# Patient Record
Sex: MALE | Race: WHITE
[De-identification: names, ages, dates, MRNs, and addresses within clinical notes are randomized per-mention and may not be internally consistent; named-entity substitution may affect disease eponyms.]

---

## 2020-05-07 ENCOUNTER — HOSPITAL ENCOUNTER (OUTPATIENT)
Dept: HOSPITAL 19 - MC.RAD | Age: 30
End: 2020-05-07
Attending: FAMILY MEDICINE
Payer: COMMERCIAL

## 2020-05-07 DIAGNOSIS — N63.20: Primary | ICD-10-CM

## 2020-12-29 ENCOUNTER — PREP FOR PROCEDURE (OUTPATIENT)
Dept: SURGERY | Age: 30
End: 2020-12-29

## 2020-12-29 ENCOUNTER — ANESTHESIA EVENT (OUTPATIENT)
Dept: OPERATING ROOM | Age: 30
End: 2020-12-29
Payer: COMMERCIAL

## 2020-12-29 ENCOUNTER — OFFICE VISIT (OUTPATIENT)
Dept: SURGERY | Age: 30
End: 2020-12-29
Payer: COMMERCIAL

## 2020-12-29 ENCOUNTER — TELEPHONE (OUTPATIENT)
Dept: SURGERY | Age: 30
End: 2020-12-29

## 2020-12-29 VITALS
SYSTOLIC BLOOD PRESSURE: 154 MMHG | DIASTOLIC BLOOD PRESSURE: 97 MMHG | TEMPERATURE: 97.3 F | BODY MASS INDEX: 38.36 KG/M2 | HEART RATE: 73 BPM | HEIGHT: 76 IN | WEIGHT: 315 LBS

## 2020-12-29 PROCEDURE — 99203 OFFICE O/P NEW LOW 30 MIN: CPT | Performed by: SURGERY

## 2020-12-29 RX ORDER — SODIUM CHLORIDE 0.9 % (FLUSH) 0.9 %
10 SYRINGE (ML) INJECTION EVERY 12 HOURS SCHEDULED
Status: CANCELLED | OUTPATIENT
Start: 2020-12-29

## 2020-12-29 RX ORDER — SODIUM CHLORIDE 0.9 % (FLUSH) 0.9 %
10 SYRINGE (ML) INJECTION PRN
Status: CANCELLED | OUTPATIENT
Start: 2020-12-29

## 2020-12-29 RX ORDER — OXYCODONE HYDROCHLORIDE AND ACETAMINOPHEN 5; 325 MG/1; MG/1
1 TABLET ORAL EVERY 4 HOURS PRN
Qty: 28 TABLET | Refills: 0 | Status: SHIPPED | OUTPATIENT
Start: 2020-12-29 | End: 2021-01-05

## 2020-12-29 SDOH — HEALTH STABILITY: MENTAL HEALTH: HOW MANY STANDARD DRINKS CONTAINING ALCOHOL DO YOU HAVE ON A TYPICAL DAY?: NOT ASKED

## 2020-12-29 SDOH — HEALTH STABILITY: MENTAL HEALTH: HOW OFTEN DO YOU HAVE A DRINK CONTAINING ALCOHOL?: MONTHLY OR LESS

## 2020-12-29 NOTE — LETTER
[facility] [address] [place] [address] [phone]  f [phone]    Padmini Aparicio MD                        SURGERY ORDER   -- Time of order -- 20    10:02 AM    Facility:   [facility]. # _________________                                                                                    Scheduled By:____________                  Surgery Date & Time:   20 @ 7:15 AM                                    Pt arrival: 5:30 AM                                                                                      Patient Name:  Minor Rayal     :  1990     PCP:  No primary care provider on file.       Home Ph:    [phone] (home)                                                     PROCEDURE:  Umbilical hernia, fat containing - K42.9    DIAGNOSIS:  Robotic ventral hernia repair with mesh, possible open  31966  Anesthesia: _General    Anesthesia: lines, blocks, TAP/epidural, etc: none  Time Needed:  1 hour    Pt Position:  supine    Bowel Prep: none  Outpatient _x_ Admit ___                Pre-Op to be done by: _N/A____  Cardiac Clearance Done by: __N/A______  Medications to be stopped 5 days before surgery: _________  Additional / Special Orders:                                                                                                                                                                                                        Padmini Aparicio MD

## 2020-12-29 NOTE — PROGRESS NOTES
[address] [address] [address] [facility] 100  Dept: [phone]  Dept Fax: [phone]  Loc: [phone]    Visit Date: 12/29/2020    Derrek Gonsales is a 35 y.o. male who presents today for: New Patient (umbilical hernia, pain since 12/23)      HPI:       Derrek Gonsales is a 35 y.o. male referred to me for further evaluation regarding painful umbilical hernia. 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 He states the pain has been intermittently severe for about 1 week. Denies previous similar symptoms. Does have a history of nausea and abdominal bloating. Had a colonoscopy about 10 years ago for blood in the stools, with only inflammatory polyps noted. Here today for further evaluation regarding recommendation for repair. Patient's problem list, medications, past medical, surgical, family, and social histories were reviewed and updated in the chart as indicated today. No past medical history on file. No past surgical history on file. Family history of uncle with colon cancer    Social History:   Social History     Tobacco Use    Smoking status: Never Smoker    Smokeless tobacco: Never Used   Substance Use Topics    Alcohol use: Yes     Frequency: Monthly or less      Tobacco cessation counseling provided as appropriate. REVIEW OF SYSTEMS:    Pertinent positives and negatives are mentioned in the HPI above. Otherwise, all other systems were reviewed and negative. Objective:     Physical Exam   BP (!) 154/97 (Site: Right Upper Arm, Position: Sitting, Cuff Size: Large Adult)   Pulse 73   Temp 97.3 °F (36.3 °C)   Ht 6' 4\" (1.93 m)   Wt (!) 325 lb (147.4 kg)   BMI 39.56 kg/m²   Constitutional: Appears well-developed and well-nourished. Grooming appropriate. No gross deformities. Body mass index is 39.56 kg/m². Eyes: No scleral icterus. Conjunctiva/lids normal. Vision intact grossly. Pupils equal/symmetric, reactive bilaterally. ENT: External ears/nose without defect, scars, or masses. Hearing grossly intact. No facial deformity. Lips normal, normal dentition. Neck: No masses. Trachea midline. No crepitus. Thyroid not enlarged. Cardiovascular: Normal rate. No peripheral edema. Abdominal aorta normal size to palpation. Pulmonary/Chest: Effort normal. No respiratory distress. No wheezes. No use of accessory muscles. Musculoskeletal: Normal range of motion x all 4 extremities and head/neck, without deformity, pain, or crepitus, with normal strength and tone. Normal gait. Nails without clubbing or cyanosis. Neurological: Alert and oriented to person, place, and time. No gross deficits. Sensation intact. Skin: Skin is dry. No rashes noted. No pallor. No induration of nodules. Psychiatric: Normal mood and affect. Behavior normal. Oriented to person, place, and time. Judgment and insight reasonable. Abdominal/wound: Soft, obese, nontender in all 4 quadrants; does have fat-containing umbilical hernia with tenderness in this area    Labs reviewed: None  Radiology reviewed: CT scan of abdomen and pelvis done at outside hospital facility reviewed and personally interpreted; small fat-containing umbilical hernia    Last colonoscopy: 10 years ago      Assessment/Plan:     A/P:  New problem(s): Fat-containing umbilical hernia with pain  Established problem(s): Obesity  Additional workup/treatment planned: Surgical repair  Risk of complications/morbidity: High    I had a long discussion with Christin Andrew today in the office regarding the pathophysiology, etiology, work-up, natural history, treatment options regarding umbilical hernia. We did discuss that it is likely the cause of his umbilical intermittent pain, but it is quite small on CT scan and only fat-containing. Did offer surgical repair, as he is currently in the off season. I did very carefully discussed with him that there is no urgency to repair from a medical standpoint, and that he would need to avoid heavy lifting strenuous activity for up to 6 weeks less than 10 to 15 pounds. Discussed risk factors for recurrence, such as obesity, stressing the repair to early, constipation. We also briefly discussed his other GI symptoms such as has a abdominal bloating and nausea. It is unlikely that these are related necessarily to the hernia, so no guarantee that these will improve after repair. He would like to have the surgery done this week, as next week he is going back home to [place]. Discussed with him that we will try to get him scheduled this week, but otherwise he can likely find a surgeon closer to home that can also do this surgery. Discussion regarding the risks, benefits, alternatives, expectations of lap and open hernia repair, including suture and mesh options, and complications including but not limited to: bleeding, infection, seroma, mesh infection, recurrence, damage to other structures. I provided written information in the After Visit Summary AVS Regarding: umbilical hernia    DISPOSITION:  Plan surgery soon    My findings will be relayed to consulting practitioner or PCP via Epic    Total encounter time:  35 min with > 50% spent with face-to-face counseling and coordination of care, including: Discussion, counseling, coordination of care as above    Note completed using dictation software, please excuse any errors.     Electronically signed by Jerzy Tan MD on 12/29/2020 at 9:57 AM

## 2020-12-29 NOTE — TELEPHONE ENCOUNTER
Called [facility] for prior authorization of surgery.  No prior authorization needed Ref# - [employer] [id number]

## 2020-12-29 NOTE — PROGRESS NOTES
[address] [facility] patients having surgery or anesthesia are required to be Covid tested. You will need to quarantined from the time you are tested until your surgery. PRIOR TO PROCEDURE DATE:  1. Please follow any guidelines/instructions prior to your procedure as advised by your surgeon. 2. Arrange for someone to drive you home and be with you for the first 24 hours after discharge for your safety after your procedure for which you received sedation. Ensure it is someone we can share information with regarding your discharge. 3. You must contact your surgeon for instructions IF:   You are taking any blood thinners, aspirin, anti-inflammatory or vitamin E.   There is a change in your physical condition such as a cold, fever, rash, cuts, sores or any other infection, especially near your surgical site. 4. Do not drink alcohol the day before or day of your procedure. 5. A Pre-op History and Physical for surgery MUST be completed by your Physician or Urgent Care within 30 days of your procedure date. Please bring a copy with you on the day of your procedure and along with any other testing performed. THE DAY OF YOUR PROCEDURE:  1. Follow instructions for ARRIVAL TIME as DIRECTED BY YOUR SURGEON. I    2. Enter the MAIN entrance from [employer] and follow the signs to the free [employer] or [employer] parking (offered free of charge 6am-5pm). 3. Enter the Main Entrance of the hospital (do not enter from the lower level of the parking garage). Upon entrance, check in with the  at the main desk on your left. If no one is available at the desk, proceed into the [facility] Waiting Room and go through the door directly into the [facility]. There is a Check-in desk ACROSS from Room 5 (marked with a sign hanging from the ceiling). The phone number for the surgery center is [phone].     4. Please call [phone] option #2 option #2 if you have not been preregistered yet. On the day of your procedure bring your insurance card and photo ID. You will be registered at your bedside once brought back to your room. 5. DO NOT EAT ANYTHING eight hours prior to surgery. May have 8 ounces of water 4 hours prior to surgery. 6. MEDICATIONS    Take the following medications with a SMALL sip of water:    Use your usual dose of inhalers the morning of surgery. BRING your rescue inhaler with you to hospital.    Anesthesia does NOT want you to take insulin the morning of surgery. They will control your blood sugar while you are at the hospital. Please contact your ordering physician for instructions regarding your insulin the night before your procedure. If you have an insulin pump, please keep it set on basal rate. 7. Do not swallow water when brushing teeth. No gum, candy, mints or ice chips. Refrain from smoking or at least decrease the amount. 8. Dress in loose, comfortable clothing appropriate for redressing after your procedure. Do not wear jewelry (including body piercings), make-up (especially NO eye make-up), fingernail polish (NO toenail polish if foot/leg surgery), lotion, powders or metal hairclips. 9. Dentures, glasses, or contacts will need to be removed before your procedure. Bring cases for your glasses, contacts, dentures, or hearing aids to protect them while you are in surgery. 10. If you use a CPAP, please bring it with you on the day of your procedure. 11. We recommend that valuable personal  belongings such as cash, cell phones, e-tablets or jewelry, be left at home during your stay. The hospital will not be responsible for valuables that are not secured in the hospital safe. However, if your insurance requires a co-pay, you may want to bring a method of payment, i.e. Check or credit card, if you wish to pay your co-pay the day of surgery.       12. If you are to stay overnight, you may bring a bag with personal items. Please have any large items you may need brought in by your family after your arrival to your hospital room. 15. If you have a Living Will or Durable Power of , please bring a copy on the day of your procedure. 15. With your permission, one family member may accompany you while you are being prepared for surgery. Once you are ready, additional family members may join you. HOW WE KEEP YOU SAFE and WORK TO PREVENT SURGICAL SITE INFECTIONS:  1. Health care workers should always check your ID bracelet to verify your name and birth date. You will be asked many times to state your name, date of birth, and allergies. 2. Health care workers should always clean their hands with soap or alcohol gel before providing care to you. It is okay to ask anyone if they cleaned their hands before they touch you. 3. You will be actively involved in verifying the type of procedure you are having and ensuring the correct surgical site. This will be confirmed multiple times prior to your procedure. Do NOT tobin your surgery site UNLESS instructed to by your surgeon. 4. Do not shave or wax for 72 hours prior to procedure near your operative site. Shaving with a razor can irritate your skin and make it easier to develop an infection. On the day of your procedure, any hair that needs to be removed near the surgical site will be clipped by a healthcare worker using a special clippers designed to avoid skin irritation. 5. When you are in the operating room, your surgical site will be cleansed with a special soap, and in most cases, you will be given an antibiotic before the surgery begins. What to expect AFTER YOUR PROCEDURE:  1. Immediately following your procedure, your will be taken to the PACU for the first phase of your recovery.   Your nurse will help you recover from any potential side effects of anesthesia, such as extreme drowsiness, changes in your vital signs or breathing patterns. Nausea, headache, muscle aches, or sore throat may also occur after anesthesia. Your nurse will help you manage these potential side effects. 2. For comfort and safety, arrange to have someone at home with you for the first 24 hours after discharge. 3. You and your family will be given written instructions about your diet, activity, dressing care, medications, and return visits. 4. Once at home, should issues with nausea, pain, or bleeding occur, or should you notice any signs of infection, you should call your surgeon. 5. Always clean your hands before and after caring for your wound. Do not let your family touch your surgery site without cleaning their hands. 6. Narcotic pain medications can cause significant constipation. You may want to add a stool softener to your postoperative medication schedule or speak to your surgeon on how best to manage this SIDE EFFECT. SPECIAL INSTRUCTIONS     Thank you for allowing us to care for you. We strive to exceed your expectations in the delivery of care and service provided to you and your family. If you need to contact us for any reason, please call us at [phone]    Instructions reviewed with patient during preadmission testing phone interview. Jacki Gleason. 12/29/2020 .1:36 PM      ADDITIONAL EDUCATIONAL INFORMATION REVIEWED PER PHONE WITH YOU AND/OR YOUR FAMILY:  No Bring a urine sample on day of surgery  No Hibiclens® Bathing Instructions   Yes Antibacterial Soap

## 2020-12-29 NOTE — PROGRESS NOTES
[facility] / [facility] ([facility]) [address] [facility], [address]    Acknowledgment of Informed Consent for Surgical or Medical Procedure and Sedation  I agree to allow doctor(s) Pacheco Roberto and his/her associates or assistants, including residents and/or other qualified medical practitioner to perform the following medical treatment or procedure and to administer or direct the administration of sedation as necessary:  Procedure(s): ROBOTIC [address] [facility], POSSIBLE OPEN  My doctor has explained the following regarding the proposed procedure:   the explanation of the procedure   the benefits of the procedure   the potential problems that might occur during recuperation   the risks and side effects of the procedure which could include but are not limited to severe blood loss, infection, stroke or death   the benefits, risks and side effect of alternative procedures including the consequences of declining this procedure or any alternative procedures   the likelihood of achieving satisfactory results. I acknowledge no guarantee or assurance has been made to me regarding the results. I understand that during the course of this treatment/procedure, unforeseen conditions can occur which require an additional or different procedure. I agree to allow my physician or assistants to perform such extension of the original procedure as they may find necessary. I understand that sedation will often result in temporary impairment of memory and fine motor skills and that sedation can occasionally progress to a state of deep sedation or general anesthesia. I understand the risks of anesthesia for surgery include, but are not limited to, sore throat, hoarseness, injury to face, mouth, or teeth; nausea; headache; injury to blood vessels or nerves; death, brain damage, or paralysis.     I understand that if I have a Limitation of Treatment order in effect during my hospitalization, the order may or may not be in effect during this procedure. I give my doctor permission to give me blood or blood products. I understand that there are risks with receiving blood such as hepatitis, AIDS, fever, or allergic reaction. I acknowledge that the risks, benefits, and alternatives of this treatment have been explained to me and that no express or implied warranty has been given by the hospital, any blood bank, or any person or entity as to the blood or blood components transfused. At the discretion of my doctor, I agree to allow observers, equipment/product representatives and allow photographing, and/or televising of the procedure, provided my name or identity is maintained confidentially. I agree the hospital may dispose of or use for scientific or educational purposes any tissue, fluid, or body parts which may be removed.     ________________________________Date________Time______ am/pm  ([place] [facility])  Patient or Signature of Closest Relative or Legal Guardian    ________________________________Date________Time______am/pm      Page 1 of  1  Witness

## 2020-12-29 NOTE — PATIENT INSTRUCTIONS
VENTRAL/UMBILICAL HERNIA PATIENT INFORMATION          What is a hernia? A hernia is a bulge under the skin in your belly. It happens when you have a weak spot in your belly muscles and a piece of your intestines or other tissues can poke through your muscles. This can cause pain or a noticeable bulge. You may notice the pain most when you lift something heavy. Occasionally, the intestine can get stuck in the hernia that can cause a blockage. This is an emergency situation. Hernias in most situations should be repaired electively (before an emergency) to prevent bowel blockage. You may have a hernia near your belly button (congenital). Or it may be in a scar from a previous surgery. How is a hernia fixed? There are several approaches to have a hernia fixed. Most repairs involve the use of a piece of screen (mesh) which can help strengthen the repair:    1) Open surgery - involves a larger cut around the hernia and placement of mesh. 2) Laparoscopic surgery - involves small \"key-hole\" incisions away from the hernia itself and the use of a video camera and long handled instruments. Mesh is secured from the inside using small tacks and suture. This will leave 3 or 4 small scars. Pain is typically less than with open surgery. Sometimes surgery will be started laparoscopic, but need to be finished open depending on scar tissue and other factors. 3) Robotic surgery - similar to laparoscopic surgery, this involves small \"key-hole\" incisions and mesh. The mesh is typically secured from the inside using slowly dissolvable sutures. This will leave 3 or 4 small scars. Pain is typically less than with open surgery. Sometimes surgery will be started robotic, but need to be finished laparoscopic or open depending on scar tissue and other factors. You will probably need to take 1 to 2 weeks off from work. If your job requires heavy lifting or other physical work, you may need to take 4 to 6 weeks off or be put on light duty. Preparing for surgery   · Understand exactly what surgery is planned, along with the risks, benefits, and other options. · Tell your doctors ALL the medicines, vitamins, supplements, and herbal remedies you take. Some of these can increase the risk of bleeding or interact with anesthesia. · If you take blood thinners, such as warfarin (Coumadin), clopidogrel (Plavix), or aspirin, be sure to talk to your doctor. Make sure that you understand exactly what your doctor wants you to do. · Your doctor will tell you which medicines to take or stop before your surgery. You may need to stop taking certain medicines a week or more before surgery. So talk to your doctor as soon as you can. · If you have an advance directive, let your doctor know. It may include a living will and a durable power of  for health care. Bring a copy to the hospital. If you don't have one, you may want to prepare one. It lets your doctor and loved ones know your health care wishes. Doctors advise that everyone prepare these papers before any type of surgery or procedure. What happens on the day of surgery? · Follow the instructions exactly about when to stop eating and drinking. If you don't, your surgery may be canceled. If your doctor told you to take your medicines on the day of surgery, take them with only a sip of water. · Take a bath or shower before you come in for your surgery. Do not apply lotions, perfumes, deodorants, or nail polish. · Do not shave the surgical site yourself. · Take off all jewelry and piercings. And take out contact lenses, if you wear them. At the hospital or surgery center   · Bring a picture ID. · You will be kept comfortable and safe by your anesthesia provider. You will be asleep during the surgery. · The surgery will take 30 minutes to 2 hours. It depends on how large the hernia is and where it is. What to expect after your surgery:  - In most cases, you can go home on the same day of surgery. You will need someone to drive you home and monitor you for 24 hours after anesthesia. Some patients may stay in the hospital overnight if needed - this is more typical in large hernias or patients with other medical problems requiring careful monitoring.    - Every patient has a different level of pain tolerance. - Most patients do well with a combination of:   - Tylenol (do not take more than 3000 mg per day)   - Ibuprofen or Motrin (NSAIDS)   - Narcotics such as Percocet or Oxycodone - only if needed. - Be careful taking narcotics, as these can make you constipated    - Use a stool softener and drink plenty of water to avoid constipation.  - Do not drive or operate machinery while taking narcotics       - Abdominal elastic binders and ice packs can also be helpful   - You should support your abdomen while coughing, with a pillow or blanket     - If you have liver disease, you should not take Tylenol   - If you have kidney disease or stomach ulcers, you should not take ibuprofen or other NSAIDS    Risks of surgery: All surgeries have risks.  Here are some of the risks that can occur with hernia surgery  - Bleeding (uncommon)  - Infection (uncommon - occasionally resulting in the need to take out the mesh)  - Fluid forming around the mesh (common - sometimes requires drainage)  - Scar formation resulting in bowel blockage (uncommon)  - Recurrence of hernia (common depending on risk factors - see below)  - Damage to other structures in the abdomen (uncommon)  - Risks of anesthesia (rare)    Risk factors for recurrence: Even with a good hernia repair, recurrence can happen. Some risk factors that increase the risk of recurrence include:    - Overuse of abdominal muscles too soon or heavy lifting  - Smoking  - Heavy lifting  - COPD and other lung diseases  - Straining and constipation  - Excessive coughing  - Use of steroids  - Uncontrolled diabetes  - Infection  - Large hernias  - Repair not utilizing mesh    Follow-up care is a key part of your treatment and safety. Be sure to make and go to all appointments, and call your doctor if you are having problems. It's also a good idea to know your test results and keep a list of the medicines you take. When should you call your doctor? · You have questions or concerns. Dr Clancy Simpler office: [phone]    · You don't understand how to prepare for your surgery. · You become ill before the surgery (such as fever, flu, or a cold). · You need to reschedule or have changed your mind about having the surgery. Please call the office ([phone]) or go to the nearest Emergency Room if you experience:  - Severe pain or swelling  - Fever higher than 101.   - Foul smelling drainage from your wounds  - Inability to urinate  - Decreased appetite, nausea, or increasing bloating  - Any other concerns or questions

## 2020-12-30 ENCOUNTER — HOSPITAL ENCOUNTER (OUTPATIENT)
Age: 30
Setting detail: OUTPATIENT SURGERY
Discharge: HOME | End: 2020-12-30
Attending: SURGERY | Admitting: SURGERY
Payer: COMMERCIAL

## 2020-12-30 ENCOUNTER — ANESTHESIA (OUTPATIENT)
Dept: OPERATING ROOM | Age: 30
End: 2020-12-30
Payer: COMMERCIAL

## 2020-12-30 ENCOUNTER — HOSPITAL ENCOUNTER (EMERGENCY)
Age: 30
Discharge: HOME | End: 2020-12-31
Attending: EMERGENCY MEDICINE
Payer: COMMERCIAL

## 2020-12-30 VITALS — TEMPERATURE: 97.7 F | OXYGEN SATURATION: 97 % | SYSTOLIC BLOOD PRESSURE: 111 MMHG | DIASTOLIC BLOOD PRESSURE: 56 MMHG

## 2020-12-30 VITALS
HEIGHT: 76 IN | OXYGEN SATURATION: 92 % | HEART RATE: 86 BPM | TEMPERATURE: 97 F | BODY MASS INDEX: 38.36 KG/M2 | DIASTOLIC BLOOD PRESSURE: 73 MMHG | SYSTOLIC BLOOD PRESSURE: 142 MMHG | RESPIRATION RATE: 16 BRPM | WEIGHT: 315 LBS

## 2020-12-30 DIAGNOSIS — K42.9 UMBILICAL HERNIA WITHOUT OBSTRUCTION AND WITHOUT GANGRENE: ICD-10-CM

## 2020-12-30 DIAGNOSIS — R50.82 POST-PROCEDURAL FEVER: Primary | ICD-10-CM

## 2020-12-30 PROCEDURE — 3700000001 HC ADD 15 MINUTES (ANESTHESIA): Performed by: SURGERY

## 2020-12-30 PROCEDURE — 6360000002 HC RX W HCPCS: Performed by: ANESTHESIOLOGY

## 2020-12-30 PROCEDURE — 6360000002 HC RX W HCPCS: Performed by: SURGERY

## 2020-12-30 PROCEDURE — 2580000003 HC RX 258: Performed by: SURGERY

## 2020-12-30 PROCEDURE — 2500000003 HC RX 250 WO HCPCS: Performed by: SURGERY

## 2020-12-30 PROCEDURE — S2900 ROBOTIC SURGICAL SYSTEM: HCPCS | Performed by: SURGERY

## 2020-12-30 PROCEDURE — 6360000002 HC RX W HCPCS: Performed by: NURSE ANESTHETIST, CERTIFIED REGISTERED

## 2020-12-30 PROCEDURE — 3600000019 HC SURGERY ROBOT ADDTL 15MIN: Performed by: SURGERY

## 2020-12-30 PROCEDURE — 85025 COMPLETE CBC W/AUTO DIFF WBC: CPT

## 2020-12-30 PROCEDURE — 7100000000 HC PACU RECOVERY - FIRST 15 MIN: Performed by: SURGERY

## 2020-12-30 PROCEDURE — 88302 TISSUE EXAM BY PATHOLOGIST: CPT

## 2020-12-30 PROCEDURE — 2500000003 HC RX 250 WO HCPCS: Performed by: NURSE ANESTHETIST, CERTIFIED REGISTERED

## 2020-12-30 PROCEDURE — C1781 MESH (IMPLANTABLE): HCPCS | Performed by: SURGERY

## 2020-12-30 PROCEDURE — 49653 PR LAP, VENTRAL HERNIA REPAIR,INCARCERATED: CPT | Performed by: SURGERY

## 2020-12-30 PROCEDURE — 2580000003 HC RX 258: Performed by: NURSE ANESTHETIST, CERTIFIED REGISTERED

## 2020-12-30 PROCEDURE — 7100000010 HC PHASE II RECOVERY - FIRST 15 MIN: Performed by: SURGERY

## 2020-12-30 PROCEDURE — 96374 THER/PROPH/DIAG INJ IV PUSH: CPT

## 2020-12-30 PROCEDURE — 2580000003 HC RX 258: Performed by: ANESTHESIOLOGY

## 2020-12-30 PROCEDURE — 80048 BASIC METABOLIC PNL TOTAL CA: CPT

## 2020-12-30 PROCEDURE — 99283 EMERGENCY DEPT VISIT LOW MDM: CPT

## 2020-12-30 PROCEDURE — 6370000000 HC RX 637 (ALT 250 FOR IP): Performed by: ANESTHESIOLOGY

## 2020-12-30 PROCEDURE — 7100000001 HC PACU RECOVERY - ADDTL 15 MIN: Performed by: SURGERY

## 2020-12-30 PROCEDURE — 2709999900 HC NON-CHARGEABLE SUPPLY: Performed by: SURGERY

## 2020-12-30 PROCEDURE — 7100000011 HC PHASE II RECOVERY - ADDTL 15 MIN: Performed by: SURGERY

## 2020-12-30 PROCEDURE — 3600000009 HC SURGERY ROBOT BASE: Performed by: SURGERY

## 2020-12-30 PROCEDURE — 3700000000 HC ANESTHESIA ATTENDED CARE: Performed by: SURGERY

## 2020-12-30 PROCEDURE — L0450 TLSO FLEX TRUNK/THOR PRE OTS: HCPCS | Performed by: SURGERY

## 2020-12-30 DEVICE — MESH SURG DIA4.5IN CIR SEPRA TECHNOLOGY VENTRALIGHT: Type: IMPLANTABLE DEVICE | Site: UMBILICAL | Status: FUNCTIONAL

## 2020-12-30 RX ORDER — FENTANYL CITRATE 50 UG/ML
25 INJECTION, SOLUTION INTRAMUSCULAR; INTRAVENOUS EVERY 5 MIN PRN
Status: DISCONTINUED | OUTPATIENT
Start: 2020-12-30 | End: 2020-12-30 | Stop reason: HOSPADM

## 2020-12-30 RX ORDER — SODIUM CHLORIDE, SODIUM LACTATE, POTASSIUM CHLORIDE, CALCIUM CHLORIDE 600; 310; 30; 20 MG/100ML; MG/100ML; MG/100ML; MG/100ML
INJECTION, SOLUTION INTRAVENOUS CONTINUOUS
Status: DISCONTINUED | OUTPATIENT
Start: 2020-12-30 | End: 2020-12-30 | Stop reason: HOSPADM

## 2020-12-30 RX ORDER — PROPOFOL 10 MG/ML
INJECTION, EMULSION INTRAVENOUS PRN
Status: DISCONTINUED | OUTPATIENT
Start: 2020-12-30 | End: 2020-12-30 | Stop reason: SDUPTHER

## 2020-12-30 RX ORDER — SODIUM CHLORIDE 0.9 % (FLUSH) 0.9 %
10 SYRINGE (ML) INJECTION PRN
Status: DISCONTINUED | OUTPATIENT
Start: 2020-12-30 | End: 2020-12-30 | Stop reason: HOSPADM

## 2020-12-30 RX ORDER — LABETALOL 20 MG/4 ML (5 MG/ML) INTRAVENOUS SYRINGE
5 EVERY 10 MIN PRN
Status: DISCONTINUED | OUTPATIENT
Start: 2020-12-30 | End: 2020-12-30 | Stop reason: HOSPADM

## 2020-12-30 RX ORDER — ENALAPRILAT 2.5 MG/2ML
1.25 INJECTION INTRAVENOUS
Status: DISCONTINUED | OUTPATIENT
Start: 2020-12-30 | End: 2020-12-30 | Stop reason: HOSPADM

## 2020-12-30 RX ORDER — ROCURONIUM BROMIDE 10 MG/ML
INJECTION, SOLUTION INTRAVENOUS PRN
Status: DISCONTINUED | OUTPATIENT
Start: 2020-12-30 | End: 2020-12-30 | Stop reason: SDUPTHER

## 2020-12-30 RX ORDER — HYDROMORPHONE HCL 110MG/55ML
PATIENT CONTROLLED ANALGESIA SYRINGE INTRAVENOUS PRN
Status: DISCONTINUED | OUTPATIENT
Start: 2020-12-30 | End: 2020-12-30 | Stop reason: SDUPTHER

## 2020-12-30 RX ORDER — FENTANYL CITRATE 50 UG/ML
50 INJECTION, SOLUTION INTRAMUSCULAR; INTRAVENOUS EVERY 5 MIN PRN
Status: DISCONTINUED | OUTPATIENT
Start: 2020-12-30 | End: 2020-12-30 | Stop reason: HOSPADM

## 2020-12-30 RX ORDER — KETOROLAC TROMETHAMINE 30 MG/ML
INJECTION, SOLUTION INTRAMUSCULAR; INTRAVENOUS PRN
Status: DISCONTINUED | OUTPATIENT
Start: 2020-12-30 | End: 2020-12-30 | Stop reason: SDUPTHER

## 2020-12-30 RX ORDER — ONDANSETRON 2 MG/ML
INJECTION INTRAMUSCULAR; INTRAVENOUS PRN
Status: DISCONTINUED | OUTPATIENT
Start: 2020-12-30 | End: 2020-12-30 | Stop reason: SDUPTHER

## 2020-12-30 RX ORDER — MIDAZOLAM HYDROCHLORIDE 1 MG/ML
2 INJECTION INTRAMUSCULAR; INTRAVENOUS
Status: COMPLETED | OUTPATIENT
Start: 2020-12-30 | End: 2020-12-30

## 2020-12-30 RX ORDER — DEXAMETHASONE SODIUM PHOSPHATE 4 MG/ML
INJECTION, SOLUTION INTRA-ARTICULAR; INTRALESIONAL; INTRAMUSCULAR; INTRAVENOUS; SOFT TISSUE PRN
Status: DISCONTINUED | OUTPATIENT
Start: 2020-12-30 | End: 2020-12-30 | Stop reason: SDUPTHER

## 2020-12-30 RX ORDER — FENTANYL CITRATE 50 UG/ML
INJECTION, SOLUTION INTRAMUSCULAR; INTRAVENOUS PRN
Status: DISCONTINUED | OUTPATIENT
Start: 2020-12-30 | End: 2020-12-30 | Stop reason: SDUPTHER

## 2020-12-30 RX ORDER — LIDOCAINE HYDROCHLORIDE 20 MG/ML
INJECTION, SOLUTION INTRAVENOUS PRN
Status: DISCONTINUED | OUTPATIENT
Start: 2020-12-30 | End: 2020-12-30 | Stop reason: SDUPTHER

## 2020-12-30 RX ORDER — ATROPINE SULFATE 0.1 MG/ML
INJECTION INTRAVENOUS PRN
Status: DISCONTINUED | OUTPATIENT
Start: 2020-12-30 | End: 2020-12-30 | Stop reason: SDUPTHER

## 2020-12-30 RX ORDER — GLYCOPYRROLATE 0.2 MG/ML
INJECTION INTRAMUSCULAR; INTRAVENOUS PRN
Status: DISCONTINUED | OUTPATIENT
Start: 2020-12-30 | End: 2020-12-30 | Stop reason: SDUPTHER

## 2020-12-30 RX ORDER — SODIUM CHLORIDE 0.9 % (FLUSH) 0.9 %
10 SYRINGE (ML) INJECTION EVERY 12 HOURS SCHEDULED
Status: DISCONTINUED | OUTPATIENT
Start: 2020-12-30 | End: 2020-12-30 | Stop reason: HOSPADM

## 2020-12-30 RX ORDER — HYDRALAZINE HYDROCHLORIDE 20 MG/ML
5 INJECTION INTRAMUSCULAR; INTRAVENOUS EVERY 5 MIN PRN
Status: DISCONTINUED | OUTPATIENT
Start: 2020-12-30 | End: 2020-12-30 | Stop reason: HOSPADM

## 2020-12-30 RX ORDER — MIDAZOLAM HYDROCHLORIDE 1 MG/ML
INJECTION INTRAMUSCULAR; INTRAVENOUS PRN
Status: DISCONTINUED | OUTPATIENT
Start: 2020-12-30 | End: 2020-12-30 | Stop reason: SDUPTHER

## 2020-12-30 RX ORDER — LIDOCAINE HYDROCHLORIDE 10 MG/ML
1 INJECTION, SOLUTION EPIDURAL; INFILTRATION; INTRACAUDAL; PERINEURAL
Status: DISCONTINUED | OUTPATIENT
Start: 2020-12-30 | End: 2020-12-30 | Stop reason: HOSPADM

## 2020-12-30 RX ORDER — EPHEDRINE SULFATE 50 MG/ML
INJECTION INTRAVENOUS PRN
Status: DISCONTINUED | OUTPATIENT
Start: 2020-12-30 | End: 2020-12-30 | Stop reason: SDUPTHER

## 2020-12-30 RX ORDER — OXYCODONE HYDROCHLORIDE AND ACETAMINOPHEN 5; 325 MG/1; MG/1
1 TABLET ORAL ONCE
Status: COMPLETED | OUTPATIENT
Start: 2020-12-30 | End: 2020-12-30

## 2020-12-30 RX ORDER — ONDANSETRON 2 MG/ML
4 INJECTION INTRAMUSCULAR; INTRAVENOUS
Status: DISCONTINUED | OUTPATIENT
Start: 2020-12-30 | End: 2020-12-30 | Stop reason: HOSPADM

## 2020-12-30 RX ADMIN — ROCURONIUM BROMIDE 70 MG: 10 INJECTION INTRAVENOUS at 07:22

## 2020-12-30 RX ADMIN — ROCURONIUM BROMIDE 30 MG: 10 INJECTION INTRAVENOUS at 07:46

## 2020-12-30 RX ADMIN — DEXMEDETOMIDINE HYDROCHLORIDE 8 MCG: 100 INJECTION, SOLUTION INTRAVENOUS at 08:51

## 2020-12-30 RX ADMIN — EPHEDRINE SULFATE 5 MG: 50 INJECTION INTRAVENOUS at 07:45

## 2020-12-30 RX ADMIN — SODIUM CHLORIDE, POTASSIUM CHLORIDE, SODIUM LACTATE AND CALCIUM CHLORIDE: 600; 310; 30; 20 INJECTION, SOLUTION INTRAVENOUS at 06:15

## 2020-12-30 RX ADMIN — HYDROMORPHONE HYDROCHLORIDE 0.5 MG: 2 INJECTION, SOLUTION INTRAMUSCULAR; INTRAVENOUS; SUBCUTANEOUS at 09:02

## 2020-12-30 RX ADMIN — KETOROLAC TROMETHAMINE 30 MG: 30 INJECTION, SOLUTION INTRAMUSCULAR; INTRAVENOUS at 08:50

## 2020-12-30 RX ADMIN — ONDANSETRON 4 MG: 2 INJECTION INTRAMUSCULAR; INTRAVENOUS at 07:22

## 2020-12-30 RX ADMIN — ATROPINE SULFATE 0.1 MG: 0.1 INJECTION, SOLUTION INTRAVENOUS at 07:44

## 2020-12-30 RX ADMIN — PROPOFOL 200 MG: 10 INJECTION, EMULSION INTRAVENOUS at 07:22

## 2020-12-30 RX ADMIN — VANCOMYCIN HYDROCHLORIDE 2000 MG: 10 INJECTION, POWDER, LYOPHILIZED, FOR SOLUTION INTRAVENOUS at 06:30

## 2020-12-30 RX ADMIN — LIDOCAINE HYDROCHLORIDE 60 MG: 20 INJECTION, SOLUTION INTRAVENOUS at 07:22

## 2020-12-30 RX ADMIN — HYDROMORPHONE HYDROCHLORIDE 0.5 MG: 1 INJECTION, SOLUTION INTRAMUSCULAR; INTRAVENOUS; SUBCUTANEOUS at 09:43

## 2020-12-30 RX ADMIN — DEXMEDETOMIDINE HYDROCHLORIDE 8 MCG: 100 INJECTION, SOLUTION INTRAVENOUS at 07:22

## 2020-12-30 RX ADMIN — SODIUM CHLORIDE, POTASSIUM CHLORIDE, SODIUM LACTATE AND CALCIUM CHLORIDE: 600; 310; 30; 20 INJECTION, SOLUTION INTRAVENOUS at 08:33

## 2020-12-30 RX ADMIN — DEXMEDETOMIDINE HYDROCHLORIDE 12 MCG: 100 INJECTION, SOLUTION INTRAVENOUS at 07:26

## 2020-12-30 RX ADMIN — MIDAZOLAM 2 MG: 1 INJECTION INTRAMUSCULAR; INTRAVENOUS at 06:48

## 2020-12-30 RX ADMIN — GLYCOPYRROLATE 0.2 MG: 0.2 INJECTION INTRAMUSCULAR; INTRAVENOUS at 07:42

## 2020-12-30 RX ADMIN — FAMOTIDINE 20 MG: 10 INJECTION, SOLUTION INTRAVENOUS at 07:39

## 2020-12-30 RX ADMIN — HYDROMORPHONE HYDROCHLORIDE 0.5 MG: 1 INJECTION, SOLUTION INTRAMUSCULAR; INTRAVENOUS; SUBCUTANEOUS at 10:00

## 2020-12-30 RX ADMIN — DEXAMETHASONE SODIUM PHOSPHATE 8 MG: 4 INJECTION, SOLUTION INTRAMUSCULAR; INTRAVENOUS at 07:22

## 2020-12-30 RX ADMIN — HYDROMORPHONE HYDROCHLORIDE 0.5 MG: 2 INJECTION, SOLUTION INTRAMUSCULAR; INTRAVENOUS; SUBCUTANEOUS at 09:08

## 2020-12-30 RX ADMIN — FENTANYL CITRATE 100 MCG: 50 INJECTION INTRAMUSCULAR; INTRAVENOUS at 07:22

## 2020-12-30 RX ADMIN — SODIUM CHLORIDE, POTASSIUM CHLORIDE, SODIUM LACTATE AND CALCIUM CHLORIDE: 600; 310; 30; 20 INJECTION, SOLUTION INTRAVENOUS at 06:00

## 2020-12-30 RX ADMIN — ENOXAPARIN SODIUM 40 MG: 40 INJECTION SUBCUTANEOUS at 06:48

## 2020-12-30 RX ADMIN — VANCOMYCIN HYDROCHLORIDE 2 G: 10 INJECTION, POWDER, LYOPHILIZED, FOR SOLUTION INTRAVENOUS at 07:15

## 2020-12-30 RX ADMIN — SUGAMMADEX 200 MG: 100 INJECTION, SOLUTION INTRAVENOUS at 08:53

## 2020-12-30 RX ADMIN — DEXMEDETOMIDINE HYDROCHLORIDE 8 MCG: 100 INJECTION, SOLUTION INTRAVENOUS at 08:53

## 2020-12-30 RX ADMIN — HYDROMORPHONE HYDROCHLORIDE 0.5 MG: 2 INJECTION, SOLUTION INTRAMUSCULAR; INTRAVENOUS; SUBCUTANEOUS at 08:57

## 2020-12-30 RX ADMIN — OXYCODONE HYDROCHLORIDE AND ACETAMINOPHEN 1 TABLET: 5; 325 TABLET ORAL at 10:45

## 2020-12-30 RX ADMIN — GLYCOPYRROLATE 0.2 MG: 0.2 INJECTION INTRAMUSCULAR; INTRAVENOUS at 08:50

## 2020-12-30 RX ADMIN — MIDAZOLAM HYDROCHLORIDE 2 MG: 2 INJECTION, SOLUTION INTRAMUSCULAR; INTRAVENOUS at 07:15

## 2020-12-30 RX ADMIN — HYDROMORPHONE HYDROCHLORIDE 0.5 MG: 2 INJECTION, SOLUTION INTRAMUSCULAR; INTRAVENOUS; SUBCUTANEOUS at 08:01

## 2020-12-30 RX ADMIN — EPHEDRINE SULFATE 5 MG: 50 INJECTION INTRAVENOUS at 07:51

## 2020-12-30 ASSESSMENT — ENCOUNTER SYMPTOMS
EYES NEGATIVE: 1
ABDOMINAL PAIN: 1
RESPIRATORY NEGATIVE: 1

## 2020-12-30 ASSESSMENT — PULMONARY FUNCTION TESTS
PIF_VALUE: 28
PIF_VALUE: 21
PIF_VALUE: 24
PIF_VALUE: 20
PIF_VALUE: 27
PIF_VALUE: 28
PIF_VALUE: 21
PIF_VALUE: 27
PIF_VALUE: 28
PIF_VALUE: 28
PIF_VALUE: 20
PIF_VALUE: 13
PIF_VALUE: 24
PIF_VALUE: 27
PIF_VALUE: 25
PIF_VALUE: 28
PIF_VALUE: 24
PIF_VALUE: 27
PIF_VALUE: 26
PIF_VALUE: 25
PIF_VALUE: 21
PIF_VALUE: 18
PIF_VALUE: 25
PIF_VALUE: 0
PIF_VALUE: 21
PIF_VALUE: 21
PIF_VALUE: 27
PIF_VALUE: 20
PIF_VALUE: 24
PIF_VALUE: 24
PIF_VALUE: 5
PIF_VALUE: 4
PIF_VALUE: 26
PIF_VALUE: 5
PIF_VALUE: 20
PIF_VALUE: 28
PIF_VALUE: 25
PIF_VALUE: 27
PIF_VALUE: 6
PIF_VALUE: 23
PIF_VALUE: 27
PIF_VALUE: 38
PIF_VALUE: 28
PIF_VALUE: 2
PIF_VALUE: 24
PIF_VALUE: 7
PIF_VALUE: 25
PIF_VALUE: 0
PIF_VALUE: 20
PIF_VALUE: 0
PIF_VALUE: 20
PIF_VALUE: 27
PIF_VALUE: 27
PIF_VALUE: 24
PIF_VALUE: 3
PIF_VALUE: 5
PIF_VALUE: 26
PIF_VALUE: 28
PIF_VALUE: 24
PIF_VALUE: 27
PIF_VALUE: 27
PIF_VALUE: 20
PIF_VALUE: 25
PIF_VALUE: 24
PIF_VALUE: 28
PIF_VALUE: 28
PIF_VALUE: 21
PIF_VALUE: 28
PIF_VALUE: 27
PIF_VALUE: 5
PIF_VALUE: 28
PIF_VALUE: 28
PIF_VALUE: 2
PIF_VALUE: 8
PIF_VALUE: 27
PIF_VALUE: 5
PIF_VALUE: 28
PIF_VALUE: 1
PIF_VALUE: 25
PIF_VALUE: 27
PIF_VALUE: 1
PIF_VALUE: 28
PIF_VALUE: 27
PIF_VALUE: 28
PIF_VALUE: 23
PIF_VALUE: 22
PIF_VALUE: 27
PIF_VALUE: 24
PIF_VALUE: 5
PIF_VALUE: 0
PIF_VALUE: 28
PIF_VALUE: 27
PIF_VALUE: 24
PIF_VALUE: 28
PIF_VALUE: 8
PIF_VALUE: 24
PIF_VALUE: 21
PIF_VALUE: 28
PIF_VALUE: 1
PIF_VALUE: 23
PIF_VALUE: 2
PIF_VALUE: 24
PIF_VALUE: 10
PIF_VALUE: 28
PIF_VALUE: 27
PIF_VALUE: 26
PIF_VALUE: 28
PIF_VALUE: 2
PIF_VALUE: 27
PIF_VALUE: 5

## 2020-12-30 ASSESSMENT — PAIN DESCRIPTION - PAIN TYPE
TYPE: SURGICAL PAIN

## 2020-12-30 ASSESSMENT — PAIN DESCRIPTION - DESCRIPTORS: DESCRIPTORS: ACHING

## 2020-12-30 ASSESSMENT — PAIN DESCRIPTION - LOCATION
LOCATION: ABDOMEN
LOCATION: ABDOMEN

## 2020-12-30 ASSESSMENT — PAIN SCALES - GENERAL
PAINLEVEL_OUTOF10: 4
PAINLEVEL_OUTOF10: 3

## 2020-12-30 ASSESSMENT — PAIN - FUNCTIONAL ASSESSMENT: PAIN_FUNCTIONAL_ASSESSMENT: 0-10

## 2020-12-30 NOTE — ANESTHESIA POSTPROCEDURE EVALUATION
Department of Anesthesiology  Postprocedure Note    Patient: Keyon Forde  MRN: [id number]  YOB: 1990  Date of evaluation: 12/30/2020  Time:  10:44 AM     Procedure Summary     Date: 12/30/20 Room / Location: [address] [place] [address]    Anesthesia Start: 0715 Anesthesia Stop: 7597    Procedure: ROBOTIC LAPAROSCOPIC ASSITED FAT CONTAINING INCARCERATED HERNIA REPAIR WITH MESH (N/A Abdomen) Diagnosis:       Umbilical hernia without obstruction and without gangrene      (Umbilical hernia, fat containing)    Surgeons: Yehuda Borrero MD Responsible Provider: Paris Priest MD    Anesthesia Type: general ASA Status: 1          Anesthesia Type: general    Emilio Phase I: Emilio Score: 8    Emilio Phase II:      Last vitals: Reviewed and per EMR flowsheets.        Anesthesia Post Evaluation    Patient location during evaluation: PACU  Patient participation: complete - patient participated  Level of consciousness: awake  Airway patency: patent  Nausea & Vomiting: no nausea and no vomiting  Complications: no  Cardiovascular status: hemodynamically stable  Respiratory status: acceptable  Hydration status: stable

## 2020-12-30 NOTE — ANESTHESIA PRE PROCEDURE
Problem List:  There is no problem list on file for this patient. Past Medical History:        Diagnosis Date    Umbilical hernia        Past Surgical History:        Procedure Laterality Date    TONSILLECTOMY      WISDOM TOOTH EXTRACTION         Social History:    Social History     Tobacco Use    Smoking status: Never Smoker    Smokeless tobacco: Never Used   Substance Use Topics    Alcohol use: Yes     Frequency: Monthly or less     Comment: occasioally                                Counseling given: Not Answered      Vital Signs (Current):   Vitals:    12/29/20 1332 12/30/20 0551   BP:  (!) 160/89   Pulse:  64   Resp:  16   Temp:  98 °F (36.7 °C)   TempSrc:  Oral   SpO2:  96%   Weight: (!) 325 lb (147.4 kg) (!) 325 lb (147.4 kg)   Height: 6' 4\" (1.93 m) 6' 4\" (1.93 m)                                              BP Readings from Last 3 Encounters:   12/30/20 (!) 160/89   12/29/20 (!) 154/97       NPO Status: Time of last liquid consumption: 2155                        Time of last solid consumption: 2156                        Date of last liquid consumption: 12/29/20                        Date of last solid food consumption: 12/29/20    BMI:   Wt Readings from Last 3 Encounters:   12/30/20 (!) 325 lb (147.4 kg)   12/29/20 (!) 325 lb (147.4 kg)     Body mass index is 39.56 kg/m². CBC: No results found for: WBC, RBC, HGB, HCT, MCV, RDW, PLT    CMP: No results found for: NA, K, CL, CO2, BUN, CREATININE, GFRAA, AGRATIO, LABGLOM, GLUCOSE, PROT, CALCIUM, BILITOT, ALKPHOS, AST, ALT    POC Tests: No results for input(s): POCGLU, POCNA, POCK, POCCL, POCBUN, POCHEMO, POCHCT in the last 72 hours.     Coags: No results found for: PROTIME, INR, APTT    HCG (If Applicable): No results found for: PREGTESTUR, PREGSERUM, HCG, HCGQUANT     ABGs: No results found for: PHART, PO2ART, KSK1AYT, DJV6RPJ, BEART, G6DRUFDX     Type & Screen (If Applicable):  No results found for: Madalyn Wheatley Drug/Infectious Status (If Applicable):  No results found for: HIV, HEPCAB    COVID-19 Screening (If Applicable): No results found for: COVID19      Anesthesia Evaluation  Patient summary reviewed and Nursing notes reviewed no history of anesthetic complications:   Airway: Mallampati: II  TM distance: >3 FB   Neck ROM: full  Mouth opening: > = 3 FB Dental: normal exam         Pulmonary:Negative Pulmonary ROS                              Cardiovascular:Negative CV ROS                      Neuro/Psych:   Negative Neuro/Psych ROS              GI/Hepatic/Renal: Neg GI/Hepatic/Renal ROS            Endo/Other: Negative Endo/Other ROS                    Abdominal:           Vascular: negative vascular ROS. Anesthesia Plan      general     ASA 1       Induction: intravenous. Anesthetic plan and risks discussed with patient.                       Dionne Roldna MD   12/30/2020

## 2020-12-30 NOTE — PROGRESS NOTES
Current Allergies: Amoxicillin and Penicillins    No results for input(s): POCGLU in the last 72 hours. Admitted to PACU bed 16 from OR. Arrived on a stretcher . Attached to PACU monitoring system. Alarms and parameters set. Report received from anesthesia personnel. OR staff did not report skin issues that were observed while in OR  No problems reported intraoperatively. Pt arrived with oxygen per simple mask with oxygen at 5 liters. Athrombic wraps in place.          Buddy Poe

## 2020-12-30 NOTE — PROGRESS NOTES
Pharmacy called x2 and requested vancomycin to be sent to [facility]   Ordered last night 12/30/20 and not sent to [facility]  As indicated

## 2020-12-30 NOTE — OP NOTE
[address], [address]                                OPERATIVE REPORT    PATIENT NAME: Alejandro Chavez                    :        1990  MED REC NO:   [id number]                          ROOM:  ACCOUNT NO:   [de-identified]                           ADMIT DATE: 2020  PROVIDER:     Gena Holland. Betsey Moreno MD    DATE OF PROCEDURE:  2020    SURGEON:  Gena Holland. Betsey Moreno MD    ASSISTANT:  None. PREOPERATIVE DIAGNOSIS:  Fat containing umbilical hernia. POSTOPERATIVE DIAGNOSIS:  Fat containing incarcerated umbilical hernia. PROCEDURE:  Robotic-assisted laparoscopic reduction and repair of fat  containing incarcerated umbilical hernia with 9.5 cm Ventralight ST  Mesh. ANESTHESIA:  General endotracheal.    COMPLICATIONS:  None. SPECIMEN:  Incarcerated preperitoneal fat. ESTIMATED BLOOD LOSS:  5 mL. INDICATIONS:  The patient is a 67-year-old male, he works as a  professional football player. Over the past one to two weeks he has  been having increasing discomfort right near the umbilicus. He  underwent CT scan which showed a small fat containing umbilical hernia. He then came to see me in the office and the pain was reproducible on  physical exam with umbilical palpation. I did review his CT scan and  confirmed the findings. He desired expedient surgical repair given his  profession. We discussed the risks, benefits, and alternatives of the  procedure including but not limited to the risks of bleeding, infection,  mesh infection, seroma, recurrence of hernia, anesthesia risk. We  discussed the potential risks of adhesions and bowel obstruction. We  discussed potential open operation. He understood all these risks and  agreed to proceed. I did carefully discuss with him postoperative  expectations including avoiding heavy lifting for six weeks.   He did understand he was at high risk for recurrence given his weight and his  size and his profession, but he desired to proceed. PROCEDURE DETAILS:  The patient was brought to the operating theater,  placed supine on the table. General endotracheal intubation was  performed by Anesthesiology. The patient was placed in the supine  position. His arms were padded and tucked. His abdomen was prepped and  draped in the usual sterile fashion using chlorhexidine prep solution. A timeout was performed confirming the patient's identity as well as the  operative site, antibiotics were confirmed to be perfusing. All safety  points were followed. SCDs were on and functioning. Lovenox was  confirmed given. I began by making an incision in the left upper quadrant midclavicular  line at Araujo's point. A 0-degree 5-mm laparoscope was used to enter  the abdominal cavity in direct OptiView fashion. The abdomen was  entered without complication, insufflated to 15 mmHg. Two additional  robotic ports were placed in the left lateral and left lower quadrant  and then the original entry port was then upgraded with an 8-mm robotic  port. The da Louann X robot was then docked in the usual fashion. There  was brief episode of bradycardia due to insufflations, so the  insufflation was stopped for about one minute and he recovered nicely. Once anesthesia was comfortable, we went ahead and re-insufflated the  abdomen and robot was then docked. I began by using cautery scissors to  clear off the preperitoneal fat from around the umbilicus down to the  fascia. There was noted to be incarcerated preperitoneal fat and the  very small umbilical hernia defect. This was all taken down and will be  passed off later as a specimen labeled as incarcerated preperitoneal  fat. We noted the defect itself which was quite small about 1 cm in  maximal dimension.   A 0 Stratafix suture was then used to primarily close the fascia together with good approximation after slightly  desufflating the abdomen down to 8 mmHg. We then chose an 11.4 cm  Ventralight ST mesh which was then trimmed to about 9.5 cm due to the  very small size. The Prolene suture was placed on the rough side of the  mesh and the mesh was then introduced in the abdominal cavity. A suture  passer was then used to pass through the exact middle of the defect and  the mesh was then grabbed using the Prolene suture and brought up to the  abdominal wall in order to keep it in place during fixation. 2-0  running barbed slow-absorbing suture x2 was then used to suture the mesh  to the abdominal wall. It set nice and flat and quite nice on the  abdominal wall. Once the mesh had been fixated in place using the  sutures, the original Prolene suture was then trimmed and removed and  the mesh looked quite nice. We inspected all four quadrants and noted  no other abnormalities. Photo documentation was obtained both before  and after the hernia repair. The incarcerated fat was removed from the  abdomen at this point using a 5-mm retrieval bag. The robot was then  undocked. Laparoscopic ports were then removed under direct  visualization, no bleeding noted at the abdominal wall. The patient was  then woken up, extubated and brought to PACU in stable condition. His  wife was updated on the operative findings.         Maggie Jefferson MD    D: 12/30/2020 9:33:13       T: 12/30/2020 11:23:57     [email or web address]  Job#: [id number]     Doc#: [id number]    CC:

## 2020-12-30 NOTE — H&P
HPI with physical exam on chart from yesterday; pt reports no changes since office visit with Dr. Uriel Leon.      Heriberto Gutierrez, DNP, APRN, ACNP  12/30/2020, 6:35 AM

## 2020-12-30 NOTE — ANESTHESIA POSTPROCEDURE EVALUATION
Department of Anesthesiology  Postprocedure Note    Patient: Radha Fonseca  MRN: [id number]  YOB: 1990  Date of evaluation: 12/30/2020  Time:  10:44 AM     Procedure Summary     Date: 12/30/20 Room / Location: [address] [place] [address]    Anesthesia Start: 0715 Anesthesia Stop: 6185    Procedure: ROBOTIC LAPAROSCOPIC ASSITED FAT CONTAINING INCARCERATED HERNIA REPAIR WITH MESH (N/A Abdomen) Diagnosis:       Umbilical hernia without obstruction and without gangrene      (Umbilical hernia, fat containing)    Surgeons: Alexa Knapp MD Responsible Provider: Rolf Shah MD    Anesthesia Type: general ASA Status: 1          Anesthesia Type: general    Emilio Phase I: Emilio Score: 8    Emilio Phase II:      Last vitals: Reviewed and per EMR flowsheets.        Anesthesia Post Evaluation    Patient location during evaluation: PACU  Patient participation: complete - patient participated  Level of consciousness: awake  Airway patency: patent  Nausea & Vomiting: no nausea and no vomiting  Complications: no  Cardiovascular status: hemodynamically stable  Respiratory status: acceptable  Hydration status: stable

## 2020-12-30 NOTE — BRIEF OP NOTE
Brief Postoperative Note      Patient: Griselda Machuca  YOB: 1990  MRN: [id number]    Date of Procedure: 12/30/2020    Pre-Op Diagnosis: Umbilical hernia, fat containing    Post-Op Diagnosis: Same       Procedure(s):  ROBOTIC LAPAROSCOPIC ASSITED FAT CONTAINING INCARCERATED HERNIA REPAIR WITH MESH    Surgeon(s):  Leia Nuno MD    Assistant:  Surgical Assistant: Pau Esparza    Anesthesia: General    Estimated Blood Loss (mL): Minimal    Complications: None    Specimens:   ID Type Source Tests Collected by Time Destination   A : PREPERITONEAL HERNIA [facility]OLOG[facility] Leia Nuno MD 12/30/2020 0803        Implants: 9.5 cm ventralight ST mesh      Drains: * No LDAs found *    Findings: incarcerated fat within umbilical hernia    Electronically signed by Leia Nuno MD on 12/30/2020 at 8:58 AM

## 2020-12-31 VITALS
HEART RATE: 90 BPM | OXYGEN SATURATION: 93 % | SYSTOLIC BLOOD PRESSURE: 145 MMHG | DIASTOLIC BLOOD PRESSURE: 77 MMHG | TEMPERATURE: 99.4 F | RESPIRATION RATE: 20 BRPM

## 2020-12-31 LAB
ANION GAP SERPL CALCULATED.3IONS-SCNC: 11 MMOL/L (ref 3–16)
BASOPHILS ABSOLUTE: 0 K/UL (ref 0–0.2)
BASOPHILS RELATIVE PERCENT: 0.2 %
BUN BLDV-MCNC: 20 MG/DL (ref 7–20)
CALCIUM SERPL-MCNC: 9 MG/DL (ref 8.3–10.6)
CHLORIDE BLD-SCNC: 99 MMOL/L (ref 99–110)
CO2: 22 MMOL/L (ref 21–32)
CREAT SERPL-MCNC: 1.1 MG/DL (ref 0.9–1.3)
EOSINOPHILS ABSOLUTE: 0 K/UL (ref 0–0.6)
EOSINOPHILS RELATIVE PERCENT: 0 %
GFR AFRICAN AMERICAN: >60
GFR NON-AFRICAN AMERICAN: >60
GLUCOSE BLD-MCNC: 112 MG/DL (ref 70–99)
HCT VFR BLD CALC: 45.9 % (ref 40.5–52.5)
HEMOGLOBIN: 15.3 G/DL (ref 13.5–17.5)
LYMPHOCYTES ABSOLUTE: 2.2 K/UL (ref 1–5.1)
LYMPHOCYTES RELATIVE PERCENT: 12 %
MCH RBC QN AUTO: 29.3 PG (ref 26–34)
MCHC RBC AUTO-ENTMCNC: 33.3 G/DL (ref 31–36)
MCV RBC AUTO: 87.9 FL (ref 80–100)
MONOCYTES ABSOLUTE: 1.1 K/UL (ref 0–1.3)
MONOCYTES RELATIVE PERCENT: 5.8 %
NEUTROPHILS ABSOLUTE: 14.8 K/UL (ref 1.7–7.7)
NEUTROPHILS RELATIVE PERCENT: 82 %
PDW BLD-RTO: 13.3 % (ref 12.4–15.4)
PLATELET # BLD: 307 K/UL (ref 135–450)
PMV BLD AUTO: 7.9 FL (ref 5–10.5)
POTASSIUM SERPL-SCNC: 4.9 MMOL/L (ref 3.5–5.1)
RBC # BLD: 5.22 M/UL (ref 4.2–5.9)
SODIUM BLD-SCNC: 132 MMOL/L (ref 136–145)
WBC # BLD: 18 K/UL (ref 4–11)

## 2020-12-31 PROCEDURE — 6360000002 HC RX W HCPCS: Performed by: EMERGENCY MEDICINE

## 2020-12-31 RX ADMIN — HYDROMORPHONE HYDROCHLORIDE 1 MG: 1 INJECTION, SOLUTION INTRAMUSCULAR; INTRAVENOUS; SUBCUTANEOUS at 00:27

## 2020-12-31 NOTE — PROGRESS NOTES
Thanks. Tried to call today to see how he is feeling. His number went to , left message to call back office at his convenience. Called wife's number, no answer and no VM.

## 2020-12-31 NOTE — CONSULTS
General Surgery   Resident Consult Note      Chief Complaint: Post-op fever    History of Present Illness:    Meg Zamora is a 35 y.o. male who is post-op day #0 from a robotic-assisted laparoscopic umbilical hernia repair who presents to [facility] ED due to concern for subjective fevers at home post-operatively. At home, he measured self-reported temperatures of 101.3F and 101.7F; following discussion with his team physician, he re-presented to the ED this evening. He also reports difficulty tolerating PO intake due to nausea since heading home, however, denying emesis. He has also not passed flatus or BM since surgery. He denies any other symptoms at this time. Past Medical History:        Diagnosis Date    Umbilical hernia        Past Surgical History:        Procedure Laterality Date    HERNIA REPAIR N/A 12/30/2020    ROBOTIC LAPAROSCOPIC ASSITED FAT CONTAINING INCARCERATED HERNIA REPAIR WITH MESH performed by Donna Lawton MD at [facility] [[email or web address]] 76 EXTRACTION         Allergies:    Amoxicillin and Penicillins    Medications:   Home Meds  No current facility-administered medications on file prior to encounter. Current Outpatient Medications on File Prior to Encounter   Medication Sig Dispense Refill    oxyCODONE-acetaminophen (PERCOCET) 5-325 MG per tablet Take 1 tablet by mouth every 4 hours as needed for Pain for up to 7 days. 28 tablet 0       Current Meds  No current facility-administered medications for this encounter. Family History:   No family history on file. Social History:   TOBACCO:   reports that he has never smoked. He has never used smokeless tobacco.  ETOH:   reports current alcohol use. DRUGS:   reports no history of drug use. Review of Systems:      Constitutional: Negative except for fever. HENT: Negative. Eyes: Negative. Respiratory: Negative. Cardiovascular: Negative. Gastrointestinal: Negative except for pain, nausea.   Genitourinary: Negative. Musculoskeletal: Negative. Skin: Negative. Endocrine: Negative. Allergic/Immunologic: Negative. Neurological: Negative. Hematological: Negative. Psychiatric/Behavioral: Negative. Physical exam:    Vitals:    12/30/20 2309 12/30/20 2315 12/30/20 2319   BP: (!) 161/83 (!) 165/88 139/77   Pulse: 90     Resp: 20     Temp: 99.4 °F (37.4 °C)     TempSrc: Oral     SpO2: 94% 92% 94%       General appearance: alert, no acute distress, grooming appropriate  HEENT: Normocephalic, atraumatic; EOMI; moist mucous membranes  Neck: trachea midline, no JVD, no lymphadenopathy  Chest/Lungs: Normal inspiratory effort, symmetric chest rise, no accessory muscle use  Cardiovascular: Regular rate and rhythm; perfusing extremities  Abdomen: soft, appropriately tender to palpation, no rigidity/guarding; incisions appear clean/dry/intact without surrounding erythema or ecchymosis  Skin: warm and dry, no rashes  Extremities: no edema, no cyanosis  Neuro: A&Ox3, no gross sensory or motor neuro deficits      Labs:    CBC:   Recent Labs     12/30/20  2324   WBC 18.0*   HGB 15.3   HCT 45.9   MCV 87.9        BMP: No results for input(s): NA, K, CL, CO2, PHOS, BUN, CREATININE in the last 72 hours. Invalid input(s): CA    Imaging:   No orders to display       Assessment/Plan:  Britta Echavarria is a 35 y.o. male with history of umbilical hernia s/p robotic-assisted umbilical hernia repair 62/61/44, POD #0. Representing to ED given subjective fevers at home. Upon discussion with patient and examination, he appears to be undergoing typical post-operative physiologic changes. Given this reassurance with regard to his symptoms, he is partial to home discharge. - Afebrile in ED. Reassurance given, education regarding post-operative expectations with respect to normal physiologic responses to surgery.  - No issue with inspiratory spirometer on demonstration in ED; no concern for atelectasis.   - No indication for urgent operative intervention.  - Continue with previously-scheduled follow up appointment. - Dispo per ED.  - Patient, plan discussed with surgical team, on-call surgical staff Dr. Sallie Valdez.     Ana Lilia Evans MD, PGY-1  12/31/20  12:31 AM  [phone]

## 2020-12-31 NOTE — ED PROVIDER NOTES
[address] [facility]          ATTENDING PHYSICIAN NOTE       Date of evaluation: 12/30/2020    Chief Complaint     Post-op Problem (increasing abdominal pain, fever. Had laproscopic hernia repair by Dr. Tri Louise earlier today.)      History of Present Illness     Grant Lorenzo is a 35 y.o. male who presents to the emergency department complaining of abdominal pain and fever. Patient is postop day 0 from robotic assisted laparoscopic incarcerated umbilical hernia repair by Dr. Tri Louise. Patient states that he was discharged home and at home he started evolving increasing abdominal pain. He also noted some redness to his abdominal wall and checked his temperature and got it initially at 1-1.3 and then 1-1.5. He did take Percocet approximately an hour and a half prior to presentation here. He denies any nausea or vomiting. He states he has not passed flatus or had a bowel movement since the surgery. He states he has urinated a denies any burning or pain with urination or cloudy urine. 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 Review of Systems     Review of Systems   Constitutional: Positive for fever. HENT: Negative. Eyes: Negative. Respiratory: Negative. Cardiovascular: Negative. Gastrointestinal: Positive for abdominal pain. Genitourinary: Negative. Musculoskeletal: Negative. Neurological: Negative. All other systems reviewed and are negative. Past Medical, Surgical, Family, and Social History     He has a past medical history of Umbilical hernia. He has a past surgical history that includes Tonsillectomy; [place] tooth extraction; and hernia repair (N/A, 12/30/2020). His family history is not on file. He reports that he has never smoked. He has never used smokeless tobacco. He reports current alcohol use.  He reports that he does not use drugs. Medications     Current Discharge Medication List      CONTINUE these medications which have NOT CHANGED    Details   oxyCODONE-acetaminophen (PERCOCET) 5-325 MG per tablet Take 1 tablet by mouth every 4 hours as needed for Pain for up to 7 days. Qty: 28 tablet, Refills: 0    Comments: Reduce doses taken as pain becomes manageable  Associated Diagnoses: Umbilical hernia without obstruction and without gangrene             Allergies     He is allergic to amoxicillin and penicillins. Physical Exam     INITIAL VITALS: BP: (!) 161/83, Temp: 99.4 °F (37.4 °C), Pulse: 90, Resp: 20, SpO2: 94 %   Physical Exam  Vitals signs and nursing note reviewed. Constitutional:       General: He is not in acute distress. HENT:      Head: Normocephalic and atraumatic. Mouth/Throat:      Mouth: Mucous membranes are moist.      Pharynx: No oropharyngeal exudate. Eyes:      General: No scleral icterus. Extraocular Movements: Extraocular movements intact. Conjunctiva/sclera: Conjunctivae normal.      Pupils: Pupils are equal, round, and reactive to light. Neck:      Musculoskeletal: Normal range of motion and neck supple. Cardiovascular:      Rate and Rhythm: Normal rate and regular rhythm. Heart sounds: Normal heart sounds. Pulmonary:      Effort: Pulmonary effort is normal.      Breath sounds: Normal breath sounds. Abdominal:      General: Bowel sounds are decreased. Tenderness: There is abdominal tenderness in the periumbilical area and suprapubic area. Comments: Patient has several laparoscopic surgical sites that are well-healing. There is no erythema or drainage noted. There is mild erythema to the skin which I feel is likely secondary to his abdominal binder. He does have tenderness to palpation in the midepigastric region and suprapubic region. He does have hypoactive bowel sounds. Musculoskeletal: Normal range of motion. General: No swelling. Skin:     General: Skin is warm and dry. Neurological:      General: No focal deficit present. Mental Status: He is alert and oriented to person, place, and time. Cranial Nerves: No cranial nerve deficit. Sensory: No sensory deficit. Motor: No weakness. Diagnostic Results     LABS:   Results for orders placed or performed during the hospital encounter of 12/30/20   CBC auto differential   Result Value Ref Range    WBC 18.0 (H) 4.0 - 11.0 K/uL    RBC 5.22 4.20 - 5.90 M/uL    Hemoglobin 15.3 13.5 - 17.5 g/dL    Hematocrit 45.9 40.5 - 52.5 %    MCV 87.9 80.0 - 100.0 fL    MCH 29.3 26.0 - 34.0 pg    MCHC 33.3 31.0 - 36.0 g/dL    RDW 13.3 12.4 - 15.4 %    Platelets 526 356 - 948 K/uL    MPV 7.9 5.0 - 10.5 fL    Neutrophils % 82.0 %    Lymphocytes % 12.0 %    Monocytes % 5.8 %    Eosinophils % 0.0 %    Basophils % 0.2 %    Neutrophils Absolute 14.8 (H) 1.7 - 7.7 K/uL    Lymphocytes Absolute 2.2 1.0 - 5.1 K/uL    Monocytes Absolute 1.1 0.0 - 1.3 K/uL    Eosinophils Absolute 0.0 0.0 - 0.6 K/uL    Basophils Absolute 0.0 0.0 - 0.2 K/uL   Basic Metabolic Panel (EP - 1)   Result Value Ref Range    Sodium 132 (L) 136 - 145 mmol/L    Potassium 4.9 3.5 - 5.1 mmol/L    Chloride 99 99 - 110 mmol/L    CO2 22 21 - 32 mmol/L    Anion Gap 11 3 - 16    Glucose 112 (H) 70 - 99 mg/dL    BUN 20 7 - 20 mg/dL    CREATININE 1.1 0.9 - 1.3 mg/dL    GFR Non-African American >60 >60    GFR African American >60 >60    Calcium 9.0 8.3 - 10.6 mg/dL     RECENT VITALS:  BP: 139/77,Temp: 99.4 °F (37.4 °C), Pulse: 90, Resp: 20, SpO2: 94 %     Procedures     N/A    ED Course     Nursing Notes, Past Medical Hx, Past Surgical Hx, Social Hx,Allergies, and Family Hx were reviewed.     patient was given the following medications:  Orders Placed This Encounter   Medications    HYDROmorphone (DILAUDID) injection 1 mg       CONSULTS:  [address] DECISIONMAKING / ASSESSMENT / Suzie brush a 35 y.o. male presents to the emergency department for abdominal pain and fever after having robotic assisted laparoscopic repair of an incarcerated umbilical hernia. In review of preoperative imaging, the patient's hernia was fat-containing only and had no bowel present. Patient reports fevers at home as high as 101.7 so came to the emergency department for evaluation. On arrival, the patient has stable vital signs. His temperature here is 99.4, though had taken Percocet prior to arrival.  Patient does have erythema in a bandlike formation across the abdomen which correlates to the borders of his abdominal binder. He has well-healing surgical incisions with no surrounding erythema, drainage, or warmth. Patient was seen by the surgical residents who feel that likely his fevers at home was secondary to atelectasis. I agree, since the timeline for a surgical infection would be too early. Patient's white blood cell count is elevated 18 but this likely due to stress demargination from his recent surgery. Renal panel shows a sodium 132 but otherwise unremarkable. I do feel the patient is stable for discharge home. Patient be started on an incentive spirometer. He will be instructed to follow-up with his surgeon as scheduled as well assisting physician. Clinical Impression     1.  Post-procedural fever        Disposition     PATIENT REFERRED TO:  Albert Soriano MD  [address] [facility]  [address] [facility]  [address] [facility] [address]            DISCHARGE MEDICATIONS:  Current Discharge Medication List          DISPOSITION Decision To Discharge 12/31/2020 12:16:42 AM        Munir Barrientos MD  12/31/20 0040

## 2020-12-31 NOTE — ED NOTES
.Patient prepared for and ready to be discharged. Patient discharged at this time in no acute distress after verbalizing understanding of discharge instructions. Patient left after receiving After Visit Summary instructions.       Trena Barnett RN  12/31/20 0040

## 2021-02-23 ENCOUNTER — VIRTUAL VISIT (OUTPATIENT)
Dept: SURGERY | Age: 31
End: 2021-02-23

## 2021-02-23 DIAGNOSIS — K42.9 UMBILICAL HERNIA WITHOUT OBSTRUCTION AND WITHOUT GANGRENE: Primary | ICD-10-CM

## 2021-02-23 NOTE — PROGRESS NOTES
I spoke to Mr. Mervin Sandifer on the telephone in lieu of a virtual visit. He tells me he is recovering very well. He has no pain. He saw a local surgeon after he twisted his core during hunting, and the local surgeon said everything felt fine. From my standpoint, he is almost 2 months out from his repair, so he is almost at full strength. He can slowly get back into work and lifting. Discussed that if he has severe pain or pulling sensation that he should take it slow. He can call me back anytime if there are any issues.

## 2021-03-08 ENCOUNTER — TELEPHONE (OUTPATIENT)
Dept: SURGERY | Age: 31
End: 2021-03-08

## 2024-06-11 ENCOUNTER — HOSPITAL ENCOUNTER (OUTPATIENT)
Dept: HOSPITAL 19 - SDCO | Age: 34
Discharge: HOME | End: 2024-06-11
Attending: ORTHOPAEDIC SURGERY
Payer: COMMERCIAL

## 2024-06-11 VITALS — SYSTOLIC BLOOD PRESSURE: 153 MMHG | HEART RATE: 72 BPM | DIASTOLIC BLOOD PRESSURE: 82 MMHG

## 2024-06-11 VITALS — DIASTOLIC BLOOD PRESSURE: 77 MMHG | HEART RATE: 76 BPM | SYSTOLIC BLOOD PRESSURE: 151 MMHG

## 2024-06-11 VITALS — SYSTOLIC BLOOD PRESSURE: 157 MMHG | TEMPERATURE: 97.5 F | DIASTOLIC BLOOD PRESSURE: 85 MMHG | HEART RATE: 72 BPM

## 2024-06-11 VITALS — HEART RATE: 72 BPM | DIASTOLIC BLOOD PRESSURE: 77 MMHG | SYSTOLIC BLOOD PRESSURE: 144 MMHG

## 2024-06-11 VITALS — WEIGHT: 315 LBS | BODY MASS INDEX: 38.36 KG/M2 | HEIGHT: 76 IN

## 2024-06-11 VITALS — SYSTOLIC BLOOD PRESSURE: 154 MMHG | DIASTOLIC BLOOD PRESSURE: 82 MMHG | HEART RATE: 72 BPM

## 2024-06-11 VITALS — DIASTOLIC BLOOD PRESSURE: 84 MMHG | HEART RATE: 78 BPM | SYSTOLIC BLOOD PRESSURE: 158 MMHG

## 2024-06-11 VITALS — TEMPERATURE: 97.8 F | DIASTOLIC BLOOD PRESSURE: 92 MMHG | SYSTOLIC BLOOD PRESSURE: 160 MMHG | HEART RATE: 64 BPM

## 2024-06-11 DIAGNOSIS — S46.212A: Primary | ICD-10-CM
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
## 2024-06-11 DIAGNOSIS — E66.01: ICD-10-CM

## (undated) DEVICE — SCISSORS SURG DIA8MM MPLR CRV ENDOWRIST

## (undated) DEVICE — SOLUTION IV 1000ML 0.9% SOD CHL

## (undated) DEVICE — FENESTRATED BIPOLAR FORCEPS: Brand: ENDOWRIST

## (undated) DEVICE — SOLUTION ANTIFOG VIS SYS CLEARIFY LAPSCP

## (undated) DEVICE — SYSTEM SMK EVAC LAP TBNG FILTER HSNG BENT STYL PNK SEE CLR

## (undated) DEVICE — SUTURE 1 STRATAFIX SYMMETRIC PDS + 30CM CT-1 SXPP1A435

## (undated) DEVICE — BINDER ABD UNIV H9IN WAIST 45-62IN E SFT COT PREM 3 PNL

## (undated) DEVICE — PLATE ES AD W 9FT CRD 2

## (undated) DEVICE — SUTURE STRATAFIX SPRL PDS + SZ 2-0 L6IN ABSRB VLT L36MM SXPP1B409

## (undated) DEVICE — SUTURE VCRL SZ 0 L54IN ABSRB VLT W/O NDL POLYGLACTIN 910 J616H

## (undated) DEVICE — JEWISH HOSPITAL TURNOVER KIT: Brand: MEDLINE INDUSTRIES, INC.

## (undated) DEVICE — TROCAR: Brand: KII FIOS FIRST ENTRY

## (undated) DEVICE — ADHESIVE SKIN CLSR 0.7ML TOP DERMBND ADV

## (undated) DEVICE — Device

## (undated) DEVICE — SURGICAL SET UP - SURE SET: Brand: MEDLINE INDUSTRIES, INC.

## (undated) DEVICE — SURE SET-DOUBLE BASIN-LF: Brand: MEDLINE INDUSTRIES, INC.

## (undated) DEVICE — NEEDLE HYPO 22GA L1.5IN BLK POLYPR HUB S STL REG BVL STR

## (undated) DEVICE — APPLICATOR MEDICATED 26 CC SOLUTION HI LT ORNG CHLORAPREP

## (undated) DEVICE — ARM DRAPE

## (undated) DEVICE — [HIGH FLOW INSUFFLATOR,  DO NOT USE IF PACKAGE IS DAMAGED,  KEEP DRY,  KEEP AWAY FROM SUNLIGHT,  PROTECT FROM HEAT AND RADIOACTIVE SOURCES.]: Brand: PNEUMOSURE

## (undated) DEVICE — COLUMN DRAPE

## (undated) DEVICE — DRAPE,LAP,CHOLE,W/TROUGHS,STERILE: Brand: MEDLINE

## (undated) DEVICE — GARMENT,MEDLINE,DVT,INT,CALF,MED, GEN2: Brand: MEDLINE

## (undated) DEVICE — BLANKET WRM W29.9XL79.1IN UP BODY FORC AIR MISTRAL-AIR

## (undated) DEVICE — LARGE NEEDLE DRIVER: Brand: ENDOWRIST

## (undated) DEVICE — SUTURE MCRYL SZ 4-0 L27IN ABSRB UD L19MM PS-2 1/2 CIR PRIM Y426H

## (undated) DEVICE — TRAY CATHETER 16FR F INCLUDE BARDX IC COMPLT CARE DRNGE BG

## (undated) DEVICE — SUTURE PROL SZ 0 L30IN NONABSORBABLE BLU L26MM CT-2 1/2 CIR 8412H

## (undated) DEVICE — ELECTROSURGICAL PENCIL ROCKER SWITCH NON COATED BLADE ELECTRODE 10 FT (3 M) CORD HOLSTER: Brand: MEGADYNE

## (undated) DEVICE — SUTURE 0 STRATAFIX SYMMETRIC PDS + 15CM CT-1 SXPP1A418

## (undated) DEVICE — SYRINGE MED 10ML SLIP TIP BLNT FILL AND LUERLOCK DISP

## (undated) DEVICE — 3M™ IOBAN™ 2 ANTIMICROBIAL INCISE DRAPE 6648EZ: Brand: IOBAN™ 2

## (undated) DEVICE — GOWN,SIRUS,POLYRNF,BRTHSLV,LG,30/CS: Brand: MEDLINE

## (undated) DEVICE — CANNULA SEAL